# Patient Record
Sex: FEMALE | Race: BLACK OR AFRICAN AMERICAN | NOT HISPANIC OR LATINO | Employment: FULL TIME | URBAN - METROPOLITAN AREA
[De-identification: names, ages, dates, MRNs, and addresses within clinical notes are randomized per-mention and may not be internally consistent; named-entity substitution may affect disease eponyms.]

---

## 2024-06-08 ENCOUNTER — HOSPITAL ENCOUNTER (EMERGENCY)
Facility: OTHER | Age: 59
Discharge: HOME | End: 2024-06-08
Attending: EMERGENCY MEDICINE
Payer: COMMERCIAL

## 2024-06-08 VITALS
TEMPERATURE: 98 F | BODY MASS INDEX: 24.35 KG/M2 | OXYGEN SATURATION: 100 % | HEIGHT: 60 IN | WEIGHT: 124 LBS | RESPIRATION RATE: 18 BRPM | HEART RATE: 64 BPM | DIASTOLIC BLOOD PRESSURE: 66 MMHG | SYSTOLIC BLOOD PRESSURE: 132 MMHG

## 2024-06-08 DIAGNOSIS — R09.A2 FOREIGN BODY SENSATION IN THROAT: Primary | ICD-10-CM

## 2024-06-08 PROCEDURE — 25000003 PHARM REV CODE 250: Performed by: EMERGENCY MEDICINE

## 2024-06-08 PROCEDURE — 99285 EMERGENCY DEPT VISIT HI MDM: CPT | Mod: 25

## 2024-06-08 RX ORDER — ALUMINUM HYDROXIDE, MAGNESIUM HYDROXIDE, AND SIMETHICONE 1200; 120; 1200 MG/30ML; MG/30ML; MG/30ML
30 SUSPENSION ORAL ONCE
Status: COMPLETED | OUTPATIENT
Start: 2024-06-08 | End: 2024-06-08

## 2024-06-08 RX ORDER — LIDOCAINE HYDROCHLORIDE 20 MG/ML
15 SOLUTION OROPHARYNGEAL ONCE
Status: COMPLETED | OUTPATIENT
Start: 2024-06-08 | End: 2024-06-08

## 2024-06-08 RX ADMIN — LIDOCAINE HYDROCHLORIDE 15 ML: 20 SOLUTION ORAL; TOPICAL at 03:06

## 2024-06-08 RX ADMIN — ALUMINUM HYDROXIDE, MAGNESIUM HYDROXIDE, AND SIMETHICONE 30 ML: 1200; 120; 1200 SUSPENSION ORAL at 03:06

## 2024-06-08 NOTE — ED PROVIDER NOTES
Encounter Date: 6/8/2024       History     Chief Complaint   Patient presents with    Foreign Body In Throat     Swallowed salmon bone on Tuesday and was seen at urgent care where they saw something on her x-ray and sent her here. Able to swallow secretions and maintain airway.     58-year-old female with no significant comorbidities sent by urgent care for evaluation of possible fish bone in her throat.  Patient was eating salmon 4 days ago, and thinks she swallowed a small bone they got stuck in the left back of her throat.  She saw another small bone in her salmon afterwards, even though there was not supposed to be any bones in it.  Since then she has had this persistent sensation of the bone stuck in the left bottom of her throat, unchanged after trying to eat bread and olive oil.  She still has been able to eat and drink normally, denies any associated difficulty breathing or abdominal pain.  Today she still felt this sensation and started to have a mild headache, so she became more concerned and went to urgent care.  They did an x-ray and sent her here for further evaluation.  No history of similar episodes, no medication use or other complaints      Review of patient's allergies indicates:  No Known Allergies  History reviewed. No pertinent past medical history.  History reviewed. No pertinent surgical history.  No family history on file.  Social History     Tobacco Use    Smoking status: Never    Smokeless tobacco: Never   Substance Use Topics    Alcohol use: Never    Drug use: Yes     Types: Marijuana     Review of Systems   Constitutional:  Negative for fever.   HENT:  Negative for congestion.         Foreign body sensation in left throat   Eyes:  Negative for redness.   Respiratory:  Negative for shortness of breath.    Cardiovascular:  Negative for chest pain.   Gastrointestinal:  Negative for abdominal pain.   Genitourinary:  Negative for dysuria.   Skin:  Negative for rash.   Neurological:  Negative  for headaches.   Psychiatric/Behavioral:  Negative for confusion.        Physical Exam     Initial Vitals [06/08/24 1246]   BP Pulse Resp Temp SpO2   132/66 64 18 98 °F (36.7 °C) 100 %      MAP       --         Physical Exam    Constitutional: She appears well-developed and well-nourished. She is not diaphoretic. No distress.   HENT:   Head: Normocephalic and atraumatic.   No posterior pharynx foreign body or lesions noted   Eyes: Conjunctivae are normal.   Neck: Neck supple.   Cardiovascular:  Normal rate, regular rhythm, S1 normal, S2 normal, normal heart sounds and intact distal pulses.           No murmur heard.  Pulmonary/Chest: Breath sounds normal. No respiratory distress. She has no wheezes. She has no rhonchi. She has no rales.   Abdominal: Abdomen is soft. There is no abdominal tenderness. There is no rebound and no guarding.   Musculoskeletal:         General: No edema.      Cervical back: Neck supple.     Neurological: She is alert and oriented to person, place, and time.   Skin: Skin is warm and dry.   Psychiatric: She has a normal mood and affect.         ED Course   Procedures  Labs Reviewed - No data to display       Imaging Results              CT Soft Tissue Neck WO Contrast (Final result)  Result time 06/08/24 15:05:14      Final result by John Hutchinson DO (06/08/24 15:05:14)                   Impression:      No evidence for definite radiopaque foreign body throughout the pharynx/larynx.    Mild moderate probable mucosal thickening floor of the right maxillary antra with underlying periapical cyst within the maxillary dentition concerning for odontogenic sinus disease.  Clinical correlation and correlation with dental examination advised    Incidental 1.5 cm heterogeneous nodule left lobe of the thyroid follow-up nonemergent thyroid ultrasound advised      Electronically signed by: John Hutchinson DO  Date:    06/08/2024  Time:    15:05               Narrative:    EXAMINATION:  CT SOFT TISSUE  NECK WITHOUT CONTRAST    CLINICAL HISTORY:  Foreign body suspected, neck, neg xray;    TECHNIQUE:  Low dose axial images, sagittal and coronal reformations were performed from the skull base to the level of the clavicles.  Contrast was not administered.    COMPARISON:  None    FINDINGS:  Pharynx/larynx: There is slight distortion of the examination by artifact from dental metal.  Within limits of the study no evidence for definite radiopaque foreign body throughout the pharynx/larynx.  Incidental impression posterior pharyngeal soft tissues related to medialized carotid arteries greater on the right    Glands: Allowing for noncontrast technique no focal parotid, submandibular lesion.  There is heterogeneous attenuation thyroid gland with multiple heterogeneous nodules largest on left measuring 1.5 cm follow-up evaluation with nonemergent thyroid ultrasound advised    Cosmetic piercing left nasal ala.  Mild moderate mucosal thickening floor of the right maxillary antra with underlying periapical cyst within the right maxillary premolar and molar teeth concerning for odontogenic sinus disease clinical correlation and correlation with dental examination recommended    No consolidation visualized lungs.  No evidence for acute fracture or subluxation cervical spine    No definite lymphadenopathy throughout the neck.  Oral cavity distorted by dental metal artifacts                                       Medications   aluminum-magnesium hydroxide-simethicone 200-200-20 mg/5 mL suspension 30 mL (30 mLs Oral Given 6/8/24 1531)     And   LIDOcaine viscous HCl 2% oral solution 15 mL (15 mLs Oral Given 6/8/24 1531)     Medical Decision Making      58-year-old female with no significant comorbidities sent by urgent care for evaluation of possible fish bone in her throat.  This occurred 4 days ago when she was eating salmon and did not know they were bones inside, and she has had persistent foreign body sensation since then, not  relieved by eating bread and olive oil.  She has still been able to eat and drink normally since then, but has persistent discomfort, and today developed a headache as well, so went to urgent care for further evaluation.  They did x-ray and per their reading no sign of foreign body seen.  Denies any other symptoms or associated complaints.  On exam patient well-appearing in no distress, normal vitals.  Unable to visualize any foreign body and posterior pharynx, no lesions or bleeding.    Differential diagnosis includes esophageal abrasion, lower pharynx foreign body.    CT neck done that shows no definite evidence for radiopaque foreign body, which should show any residual fishbone material.  It does show some salt thickening of the right maxillary antra related to dental disease, which patient was advised on for outpatient dental follow up, no sign of abscess or dental infection currently.  She was treated with GI cocktail for possible esophageal abrasion and does feel much improved after this.  I discussed with her transfer to [facility] for ENT direct visualization, versus further supportive care for suspected esophageal abrasion and return precautions if not improved, and she feels comfortable with discharge and close ENT follow up as needed with return precautions.  She was advised on soft diet, Cepacol other oral anesthetics as needed, and further supportive care.      Amount and/or Complexity of Data Reviewed  Radiology: ordered.    Risk  OTC drugs.  Prescription drug management.                                      Clinical Impression:  Final diagnoses:  [R09.A2] Foreign body sensation in throat (Primary)          ED Disposition Condition    Discharge Stable          ED Prescriptions    None       Follow-up Information       Follow up With Specialties Details Why Contact Info Additional Information    [facility] Schedule an appointment as soon as possible for a visit in 3  days  [address]  [facility] [id number] [phone] Ear, Nose & Thro[facility] Services - Main Building, 4th Floor Please park in [facility] and use Clinic elevator             Luis Carlos Gee MD  06/08/24 2005

## 2025-07-31 ENCOUNTER — LAB VISIT (OUTPATIENT)
Dept: LAB | Facility: HOSPITAL | Age: 60
End: 2025-07-31
Attending: INTERNAL MEDICINE
Payer: COMMERCIAL

## 2025-07-31 ENCOUNTER — OFFICE VISIT (OUTPATIENT)
Dept: INTERNAL MEDICINE | Facility: CLINIC | Age: 60
End: 2025-07-31
Payer: COMMERCIAL

## 2025-07-31 VITALS
HEIGHT: 60 IN | SYSTOLIC BLOOD PRESSURE: 140 MMHG | BODY MASS INDEX: 25.97 KG/M2 | OXYGEN SATURATION: 99 % | DIASTOLIC BLOOD PRESSURE: 82 MMHG | WEIGHT: 132.25 LBS | HEART RATE: 70 BPM

## 2025-07-31 DIAGNOSIS — R03.0 ELEVATED BLOOD PRESSURE READING WITHOUT DIAGNOSIS OF HYPERTENSION: ICD-10-CM

## 2025-07-31 DIAGNOSIS — Z12.31 ENCOUNTER FOR SCREENING MAMMOGRAM FOR MALIGNANT NEOPLASM OF BREAST: ICD-10-CM

## 2025-07-31 DIAGNOSIS — Z13.820 SCREENING FOR OSTEOPOROSIS: ICD-10-CM

## 2025-07-31 DIAGNOSIS — E66.3 OVERWEIGHT: ICD-10-CM

## 2025-07-31 DIAGNOSIS — Z80.3 FAMILY HISTORY OF BREAST CANCER IN MOTHER: ICD-10-CM

## 2025-07-31 DIAGNOSIS — Z12.11 SCREEN FOR COLON CANCER: ICD-10-CM

## 2025-07-31 DIAGNOSIS — Z00.00 ANNUAL PHYSICAL EXAM: ICD-10-CM

## 2025-07-31 DIAGNOSIS — Z00.00 ANNUAL PHYSICAL EXAM: Primary | ICD-10-CM

## 2025-07-31 DIAGNOSIS — I83.90 VARICOSE VEINS OF CALF: ICD-10-CM

## 2025-07-31 DIAGNOSIS — F12.90 MARIJUANA USE, CONTINUOUS: ICD-10-CM

## 2025-07-31 DIAGNOSIS — E04.1 THYROID NODULE: ICD-10-CM

## 2025-07-31 LAB
ABSOLUTE EOSINOPHIL (OHS): 0.08 K/UL
ABSOLUTE MONOCYTE (OHS): 0.31 K/UL (ref 0.3–1)
ABSOLUTE NEUTROPHIL COUNT (OHS): 2.38 K/UL (ref 1.8–7.7)
ALBUMIN SERPL BCP-MCNC: 4.2 G/DL (ref 3.5–5.2)
ALP SERPL-CCNC: 146 UNIT/L (ref 40–150)
ALT SERPL W/O P-5'-P-CCNC: 27 UNIT/L (ref 0–55)
ANION GAP (OHS): 9 MMOL/L (ref 8–16)
AST SERPL-CCNC: 34 UNIT/L (ref 0–50)
BASOPHILS # BLD AUTO: 0.04 K/UL
BASOPHILS NFR BLD AUTO: 0.8 %
BILIRUB SERPL-MCNC: 1 MG/DL (ref 0.1–1)
BUN SERPL-MCNC: 8 MG/DL (ref 8–23)
CALCIUM SERPL-MCNC: 9.1 MG/DL (ref 8.7–10.5)
CHLORIDE SERPL-SCNC: 110 MMOL/L (ref 95–110)
CHOLEST SERPL-MCNC: 191 MG/DL (ref 120–199)
CHOLEST/HDLC SERPL: 3 {RATIO} (ref 2–5)
CO2 SERPL-SCNC: 25 MMOL/L (ref 23–29)
CREAT SERPL-MCNC: 0.7 MG/DL (ref 0.5–1.4)
EAG (OHS): 103 MG/DL (ref 68–131)
ERYTHROCYTE [DISTWIDTH] IN BLOOD BY AUTOMATED COUNT: 12.7 % (ref 11.5–14.5)
GFR SERPLBLD CREATININE-BSD FMLA CKD-EPI: >60 ML/MIN/1.73/M2
GLUCOSE SERPL-MCNC: 87 MG/DL (ref 70–110)
HBA1C MFR BLD: 5.2 % (ref 4–5.6)
HCT VFR BLD AUTO: 39.5 % (ref 37–48.5)
HCV AB SERPL QL IA: NORMAL
HDLC SERPL-MCNC: 64 MG/DL (ref 40–75)
HDLC SERPL: 33.5 % (ref 20–50)
HGB BLD-MCNC: 12.5 GM/DL (ref 12–16)
HIV 1+2 AB+HIV1 P24 AG SERPL QL IA: NORMAL
IMM GRANULOCYTES # BLD AUTO: 0.01 K/UL (ref 0–0.04)
IMM GRANULOCYTES NFR BLD AUTO: 0.2 % (ref 0–0.5)
LDLC SERPL CALC-MCNC: 113.8 MG/DL (ref 63–159)
LYMPHOCYTES # BLD AUTO: 1.9 K/UL (ref 1–4.8)
MCH RBC QN AUTO: 30.1 PG (ref 27–31)
MCHC RBC AUTO-ENTMCNC: 31.6 G/DL (ref 32–36)
MCV RBC AUTO: 95 FL (ref 82–98)
NONHDLC SERPL-MCNC: 127 MG/DL
NUCLEATED RBC (/100WBC) (OHS): 0 /100 WBC
PLATELET # BLD AUTO: 277 K/UL (ref 150–450)
PMV BLD AUTO: 9.6 FL (ref 9.2–12.9)
POTASSIUM SERPL-SCNC: 4.1 MMOL/L (ref 3.5–5.1)
PROT SERPL-MCNC: 7.9 GM/DL (ref 6–8.4)
RBC # BLD AUTO: 4.15 M/UL (ref 4–5.4)
RELATIVE EOSINOPHIL (OHS): 1.7 %
RELATIVE LYMPHOCYTE (OHS): 40.3 % (ref 18–48)
RELATIVE MONOCYTE (OHS): 6.6 % (ref 4–15)
RELATIVE NEUTROPHIL (OHS): 50.4 % (ref 38–73)
SODIUM SERPL-SCNC: 144 MMOL/L (ref 136–145)
TRIGL SERPL-MCNC: 66 MG/DL (ref 30–150)
TSH SERPL-ACNC: 0.68 UIU/ML (ref 0.4–4)
WBC # BLD AUTO: 4.72 K/UL (ref 3.9–12.7)

## 2025-07-31 PROCEDURE — 83036 HEMOGLOBIN GLYCOSYLATED A1C: CPT

## 2025-07-31 PROCEDURE — 80061 LIPID PANEL: CPT

## 2025-07-31 PROCEDURE — 86803 HEPATITIS C AB TEST: CPT

## 2025-07-31 PROCEDURE — 36415 COLL VENOUS BLD VENIPUNCTURE: CPT

## 2025-07-31 PROCEDURE — 85025 COMPLETE CBC W/AUTO DIFF WBC: CPT

## 2025-07-31 PROCEDURE — 82040 ASSAY OF SERUM ALBUMIN: CPT

## 2025-07-31 PROCEDURE — 87389 HIV-1 AG W/HIV-1&-2 AB AG IA: CPT

## 2025-07-31 PROCEDURE — 84443 ASSAY THYROID STIM HORMONE: CPT

## 2025-07-31 PROCEDURE — 99999 PR PBB SHADOW E&M-EST. PATIENT-LVL V: CPT | Mod: PBBFAC,,, | Performed by: INTERNAL MEDICINE

## 2025-07-31 NOTE — PROGRESS NOTES
Subjective:       Patient ID: Jimena Foster is a 59 y.o. female.    Chief Complaint: Annual Exam and Varicose Veins     Jimena Foster is a 59 y.o.  female who presents for Annual Exam and Varicose Veins  .  No hx of htn, elevated today    Uti a couple of weeks ago- treated with herbal remedies and symptoms resolved.        History of Present Illness    Ms. Foster presents today as a new patient for establishment of care    She has a history of anemia, last documented during permanent residency testing in 2013. She experienced early menopause starting at age 36-37, completing periods by age 48. An incidental thyroid nodule was found on CT. She has recurrent sinusitis, particularly during summer with air conditioning exposure. She has a history of urinary tract infections, including a severe episode three years ago with mental status changes. She has mild varicose veins that occasionally ache, managed by leg elevation.    She reports lifelong low blood pressure and low blood sugar. Her blood pressure began elevating to normal range approximately 10 years ago, coinciding with early menopause.    She was vegetarian for 30 years and recently started consuming meat due to protein intake challenges. Her current diet includes salmon, smoked turkey, cardoza, tofu, tempeh, and some beans. She notes difficulty with meat textures and challenges obtaining adequate protein through previous vegetarian sources.    Mother is 82 years old with mild hypertension and non-inherited Stage 0 breast cancer diagnosed at age 75. Biological father is alive, almost 90 years old. Adoptive father is 83 years old with hypertension and prostate issues. She has two sisters and two half-brothers, with one younger half-brother recently treated for brain cancer. Family history is notable for maternal thyroid issues.    She is currently single after a 25-year long-term relationship, working through the emotional transition. She has a brief smoking  history from 30 years ago lasting 6 months. She currently uses marijuana daily via smoking. Alcohol consumption is 2-4 times per month, never exceeding one drink per occasion.    Past surgical history includes tonsillectomy.      ROS:  ENT: +sinus pressure  Psychiatric: +anxiety       Problem List[1]      Past Medical History:   Diagnosis Date    Anemia, unspecified        Past Surgical History:   Procedure Laterality Date    TONSILLECTOMY         Family History   Problem Relation Name Age of Onset    Hypertension Mother  80    Breast cancer Mother  75        stage 0    Brain cancer Half-brother         Social History[2]      Review of Systems   Constitutional:  Negative for activity change and unexpected weight change.   HENT:  Negative for hearing loss, rhinorrhea and trouble swallowing.    Eyes:  Negative for discharge and visual disturbance.   Respiratory:  Negative for chest tightness and wheezing.    Cardiovascular:  Negative for chest pain and palpitations.   Gastrointestinal:  Negative for blood in stool, constipation, diarrhea and vomiting.   Endocrine: Negative for polydipsia and polyuria.   Genitourinary:  Negative for difficulty urinating, dysuria, hematuria and menstrual problem.   Musculoskeletal:  Negative for arthralgias, joint swelling and neck pain.   Neurological:  Positive for headaches. Negative for weakness.   Psychiatric/Behavioral:  Positive for dysphoric mood. Negative for confusion.          Objective:   Blood pressure (!) 140/82, pulse 70, height 5' (1.524 m), weight 60 kg (132 lb 4.4 oz), SpO2 99%.     Physical Exam  Constitutional:       Appearance: Normal appearance. She is well-developed. She is not ill-appearing.   HENT:      Head: Normocephalic and atraumatic.   Eyes:      General: No scleral icterus.     Conjunctiva/sclera: Conjunctivae normal.   Cardiovascular:      Rate and Rhythm: Normal rate.      Heart sounds: Normal heart sounds. No murmur heard.     No friction rub. No  gallop.   Pulmonary:      Effort: Pulmonary effort is normal.      Breath sounds: Normal breath sounds. No wheezing or rales.   Chest:      Chest wall: No tenderness.   Abdominal:      General: Bowel sounds are normal.      Palpations: Abdomen is soft.   Musculoskeletal:         General: No tenderness or signs of injury.      Right lower leg: No edema.      Left lower leg: No edema.      Comments: Mild varicose vein cluster visible on left lateral calf, not protruding, multiple areas of spider veins present   Skin:     General: Skin is warm and dry.   Neurological:      Mental Status: She is alert and oriented to person, place, and time. Mental status is at baseline.   Psychiatric:         Behavior: Behavior normal.         Thought Content: Thought content normal.       Physical Exam    Extremities: Varicose veins present in legs.     No thyroid nodule palpable       Prior labs reviewed    Health Maintenance         Date Due Completion Date    Hepatitis C Screening Never done ---    Cervical Cancer Screening Never done ---    Lipid Panel Never done ---    HIV Screening Never done ---    TETANUS VACCINE Never done ---    Mammogram Never done ---    Hemoglobin A1c (Diabetic Prevention Screening) Never done ---    Colorectal Cancer Screening Never done ---    Shingles Vaccine (1 of 2) Never done ---    Pneumococcal Vaccines (Age 50+) (1 of 1 - PCV) Never done ---    COVID-19 Vaccine (1 - 2024-25 season) Never done ---    Influenza Vaccine (1) 09/01/2025 ---    RSV Vaccine (Age 60+ and Pregnant patients) (1 - 1-dose 75+ series) 08/12/2038 ---            Assessment/Plan:       1. Annual physical exam  -     TSH; Future; Expected date: 07/31/2025  -     Hemoglobin A1C; Future; Expected date: 07/31/2025  -     Comprehensive Metabolic Panel; Future; Expected date: 07/31/2025  -     CBC Auto Differential; Future; Expected date: 07/31/2025  -     Lipid Panel; Future; Expected date: 07/31/2025  -     EKG 12-lead; Future  -      HIV 1/2 Ag/Ab (4th Gen); Future; Expected date: 07/31/2025  -     Hepatitis C Antibody; Future; Expected date: 07/31/2025  -     Ambulatory referral/consult to Integrative Medicine; Future; Expected date: 08/07/2025  Recommend daily sunscreen, cardiovascular exercise min 30 min 5 days per week. Seatbelts routinely.  Wash hands frequently to avoid contact with viruses.   Recommend monthly self breast exams and annual mammogram OVER AGE 40 or as recommended.  Also screening  pap with reflex HPV q 3 years or as recommended by gyn provider.    2. Elevated blood pressure reading without diagnosis of hypertension  Low salt diet, regular exercise  Repeat on rtc    3. Thyroid nodule  -     US Soft Tissue Head Neck; Future; Expected date: 07/31/2025  Check tsh    4. Overweight  - rec diet and exercise  - increase intensity and duration of CV exercise to continue weight loss  - goal wt loss one pound per week  - portion control, healthy choices    5. Marijuana use, continuous  Not using prior to work but daily use  Smoking, discussed potential harm to lungs    6. Encounter for screening mammogram for malignant neoplasm of breast  -     Mammo Digital Screening Bilat w/ [facility] (XPD); Future; Expected date: 07/31/2025    7. Screen for colon cancer  -     Ambulatory referral/consult to Endo Procedure ; Future; Expected date: 08/01/2025    8. Family history of breast cancer in mother  Recommend 150 mis exercise weekly to reduce risk of primary breast cancer  Regular mammograms and self breast exams recommended    9. Screening for osteoporosis  -     DXA Bone Density Axial Skeleton 1 or more sites; Future; Expected date: 07/31/2025  Recommend vit d 1000 IU daily  Wt bearing exercise         - Evaluated BP, slightly elevated at this visit, considering history of low BP and recent dietary changes.  - Assessed cardiovascular risk factors, including family history and lifestyle habits.  - Noted incidental thyroid nodule on  previous CT.  - Reviewed history of UTIs and associated symptoms.  - Considered screening for HIV and Hepatitis C based on standard recommendations.  - Evaluated varicose veins and discussed conservative management options including etiology, progression, and importance of avoiding crossing legs, elevating feet when possible.    PLAN SUMMARY:  - Order thyroid ultrasound to follow up on incidental nodule  - Recommend 150 minutes of moderate to strenuous cardiovascular exercise weekly  - Recommend compression stockings and leg elevation for varicose vein management  - Continue regular physical activity, including walking and yoga  - Use sunscreen regularly, especially for fair skin  - Limit consumption of processed and red meats  - Reduce sodium intake  - Order baseline labs  - Follow up in a few weeks after lab results for BP check     MENOPAUSE:  - Noted that the patient's blood pressure started elevating about 10 years ago, around early menopause.  - Ms. Foster was perimenopausal by age 46-47 and completed menopause at age 48.  - Explained relationship between aging, decreased vascular elasticity, and hypertension.    THYROID NODULE:  - Ordered thyroid ultrasound to follow up on incidental nodule found on previous CT.  - Noted that mother had thyroid issues, which appear to be hereditary.    CHRONIC SINUSITIS:  - Identified mild-moderate probable mucosal thickening of the maxillary sinuses on imaging, indicating probable sinusitis.    VARICOSE VEINS:  - Ms. Foster reports occasional aching from varicose veins and elevates her feet when discomfort occurs.  - Explained that varicose veins are genetic and result from a weak back valve within the vein.  - Recommend compression stockings and leg elevation for management.    CANNABIS USE:  - Documented that the patient smokes marijuana daily   - Ms. Foster is attempting to reduce cannabis smoking due to concerns about pulmonary health.    HISTORY OF anemia   -  Documented that the patient has experienced mild anemia over the years.  -  check cbc    HISTORY OF URINARY tract infections:  - Noted that the patient had a UTI 3 years ago that caused Alzheimer's-level symptoms and was treated with antibiotics.  - Instructed the patient to contact the office if experiencing recurrent urinary tract infections.       FAMILY HISTORY OF BREAST CANCER in mother:  - Noted that mother had Stage 0 breast cancer at age 75     CANCER PREVENTION AND GENERAL HEALTH:  - Recommend 150 minutes of moderate to strenuous cardiovascular exercise weekly (e.g., 20-30 minute brisk walk daily) to decrease risk of recurrence and primary cancer.  - Ms. Foster to continue regular physical activity, including walking and yoga.  - Educated on importance of sunscreen use, particularly for fair-skinned individuals, and patient to use sunscreen regularly.  - Discussed carcinogenic nature of processed and red meats and recommended limiting consumption.      FOLLOW-UP:  - Ordered baseline labs.  - Follow up in a few weeks after lab results are available for BP check.               Recommend patient complete vaccinations as listed in the [facility] health maintenance report. Pt may obtain vaccinations at their local pharmacy, any [facility] pharmacy or request vaccination in our clinic.  Please note that some insurances will only cover vaccination cost at specific locations.Please check with your insurance provider regarding your coverage.  Vaccines that are not covered are still recommended.               This note was generated with the assistance of ambient listening technology. Verbal consent was obtained by the patient and accompanying visitor(s) for the recording of patient appointment to facilitate this note. I attest to having reviewed and edited the generated note for accuracy, though some syntax or spelling errors may persist. Please contact the author of this note for any clarification.             [1]    Patient Active Problem List  Diagnosis    Family history of breast cancer in mother    Overweight    Thyroid nodule    Elevated blood pressure reading without diagnosis of hypertension    Marijuana use, continuous    Varicose veins of calf   [2]   Social History  Tobacco Use    Smoking status: Never    Smokeless tobacco: Never   Substance Use Topics    Alcohol use: Never    Drug use: Yes     Frequency: 7.0 times per week     Types: Marijuana

## 2025-08-04 ENCOUNTER — HOSPITAL ENCOUNTER (OUTPATIENT)
Dept: CARDIOLOGY | Facility: CLINIC | Age: 60
Discharge: HOME | End: 2025-08-04
Payer: COMMERCIAL

## 2025-08-04 DIAGNOSIS — Z00.00 ANNUAL PHYSICAL EXAM: ICD-10-CM

## 2025-08-04 LAB
OHS QRS DURATION: 66 MS
OHS QTC CALCULATION: 425 MS

## 2025-08-04 PROCEDURE — 93010 ELECTROCARDIOGRAM REPORT: CPT | Mod: S$GLB,,, | Performed by: INTERNAL MEDICINE

## 2025-08-04 PROCEDURE — 93000 ELECTROCARDIOGRAM COMPLETE: CPT | Mod: S$GLB,,, | Performed by: INTERNAL MEDICINE

## 2025-08-08 ENCOUNTER — TELEPHONE (OUTPATIENT)
Dept: ENDOSCOPY | Facility: HOSPITAL | Age: 60
End: 2025-08-08
Payer: COMMERCIAL

## 2025-08-08 NOTE — TELEPHONE ENCOUNTER
Called patient to schedule colonoscopy.  Patient stated she wants to have procedure closer to her home, [facility].  Stated someone else gave her the number to schedule but she just needs to find a day that will work for her and who will pick her up etc. Patient does not want to schedule at any of the locations that we are able to schedule.

## 2025-08-12 ENCOUNTER — TELEPHONE (OUTPATIENT)
Dept: ENDOSCOPY | Facility: HOSPITAL | Age: 60
End: 2025-08-12
Payer: COMMERCIAL

## 2025-08-12 VITALS — HEIGHT: 60 IN | WEIGHT: 132 LBS | BODY MASS INDEX: 25.91 KG/M2

## 2025-08-12 DIAGNOSIS — Z12.11 SCREEN FOR COLON CANCER: Primary | ICD-10-CM

## 2025-08-12 DIAGNOSIS — Z12.11 SPECIAL SCREENING FOR MALIGNANT NEOPLASMS, COLON: ICD-10-CM

## 2025-08-13 ENCOUNTER — HOSPITAL ENCOUNTER (OUTPATIENT)
Dept: RADIOLOGY | Facility: HOSPITAL | Age: 60
Discharge: HOME | End: 2025-08-13
Attending: INTERNAL MEDICINE
Payer: COMMERCIAL

## 2025-08-13 DIAGNOSIS — E04.1 THYROID NODULE: ICD-10-CM

## 2025-08-13 PROCEDURE — 76536 US EXAM OF HEAD AND NECK: CPT | Mod: TC

## 2025-09-05 ENCOUNTER — HOSPITAL ENCOUNTER (OUTPATIENT)
Dept: RADIOLOGY | Facility: CLINIC | Age: 60
Discharge: HOME | End: 2025-09-05
Attending: INTERNAL MEDICINE
Payer: COMMERCIAL

## 2025-09-05 DIAGNOSIS — Z13.820 SCREENING FOR OSTEOPOROSIS: ICD-10-CM

## 2025-09-05 PROCEDURE — 77080 DXA BONE DENSITY AXIAL: CPT | Mod: TC
